# Patient Record
Sex: MALE | Race: WHITE | NOT HISPANIC OR LATINO | Employment: OTHER | URBAN - NONMETROPOLITAN AREA
[De-identification: names, ages, dates, MRNs, and addresses within clinical notes are randomized per-mention and may not be internally consistent; named-entity substitution may affect disease eponyms.]

---

## 2023-11-25 DIAGNOSIS — E78.2 MIXED HYPERLIPIDEMIA: ICD-10-CM

## 2023-11-27 PROBLEM — I25.10 CAD (CORONARY ARTERY DISEASE): Status: ACTIVE | Noted: 2023-11-27

## 2023-11-27 PROBLEM — Z95.2 S/P AORTIC VALVE REPLACEMENT: Status: ACTIVE | Noted: 2023-11-27

## 2023-11-27 PROBLEM — E78.5 HYPERLIPIDEMIA: Status: ACTIVE | Noted: 2023-11-27

## 2023-11-27 PROBLEM — H40.9 GLAUCOMA: Status: ACTIVE | Noted: 2023-11-27

## 2023-11-27 PROBLEM — Z95.1 S/P CABG X 2: Status: ACTIVE | Noted: 2023-11-27

## 2023-11-27 PROBLEM — F17.200 CURRENT SMOKER: Status: ACTIVE | Noted: 2023-11-27

## 2023-11-27 PROBLEM — J90 PLEURAL EFFUSION: Status: ACTIVE | Noted: 2023-11-27

## 2023-11-27 PROBLEM — I10 HTN (HYPERTENSION): Status: ACTIVE | Noted: 2023-11-27

## 2023-11-27 PROBLEM — R01.1 HEART MURMUR: Status: ACTIVE | Noted: 2023-11-27

## 2023-11-27 PROBLEM — E11.9 DIABETES MELLITUS (MULTI): Status: ACTIVE | Noted: 2023-11-27

## 2023-11-27 PROBLEM — I63.9 CVA (CEREBRAL VASCULAR ACCIDENT) (MULTI): Status: ACTIVE | Noted: 2023-11-27

## 2023-11-27 RX ORDER — LISINOPRIL 20 MG/1
20 TABLET ORAL DAILY
COMMUNITY
End: 2024-05-09 | Stop reason: ALTCHOICE

## 2023-11-27 RX ORDER — IBUPROFEN 100 MG/5ML
1 SUSPENSION, ORAL (FINAL DOSE FORM) ORAL 2 TIMES DAILY
COMMUNITY
Start: 2021-05-10

## 2023-11-27 RX ORDER — INSULIN GLARGINE 100 [IU]/ML
INJECTION, SOLUTION SUBCUTANEOUS
COMMUNITY

## 2023-11-27 RX ORDER — METOPROLOL TARTRATE 25 MG/1
25 TABLET, FILM COATED ORAL 2 TIMES DAILY
COMMUNITY
End: 2024-05-09 | Stop reason: DRUGHIGH

## 2023-11-27 RX ORDER — INSULIN GLARGINE 100 [IU]/ML
20 INJECTION, SOLUTION SUBCUTANEOUS DAILY
COMMUNITY
End: 2024-05-09 | Stop reason: ALTCHOICE

## 2023-11-27 RX ORDER — ATORVASTATIN CALCIUM 40 MG/1
40 TABLET, FILM COATED ORAL NIGHTLY
COMMUNITY
End: 2024-05-09 | Stop reason: SDUPTHER

## 2023-11-27 RX ORDER — NAPROXEN SODIUM 220 MG/1
1 TABLET ORAL 2 TIMES DAILY
COMMUNITY
Start: 2021-05-10

## 2023-11-27 RX ORDER — ACETAMINOPHEN 500 MG
1 TABLET ORAL DAILY
COMMUNITY
Start: 2021-05-10

## 2023-11-27 RX ORDER — ASPIRIN 81 MG/1
1 TABLET ORAL DAILY
COMMUNITY
Start: 2021-05-10

## 2023-11-27 RX ORDER — LANOLIN ALCOHOL/MO/W.PET/CERES
1 CREAM (GRAM) TOPICAL DAILY
COMMUNITY
Start: 2021-05-10

## 2023-11-28 RX ORDER — ATORVASTATIN CALCIUM 40 MG/1
40 TABLET, FILM COATED ORAL NIGHTLY
Qty: 90 TABLET | Refills: 3 | Status: SHIPPED | OUTPATIENT
Start: 2023-11-28 | End: 2024-05-09 | Stop reason: SDUPTHER

## 2024-05-09 ENCOUNTER — OFFICE VISIT (OUTPATIENT)
Dept: CARDIOLOGY | Facility: CLINIC | Age: 64
End: 2024-05-09
Payer: MEDICARE

## 2024-05-09 VITALS
SYSTOLIC BLOOD PRESSURE: 146 MMHG | WEIGHT: 225 LBS | BODY MASS INDEX: 33.33 KG/M2 | HEART RATE: 60 BPM | DIASTOLIC BLOOD PRESSURE: 94 MMHG | HEIGHT: 69 IN

## 2024-05-09 DIAGNOSIS — F17.200 CURRENT SMOKER: ICD-10-CM

## 2024-05-09 DIAGNOSIS — I25.10 CORONARY ARTERY DISEASE INVOLVING NATIVE CORONARY ARTERY OF NATIVE HEART WITHOUT ANGINA PECTORIS: Primary | ICD-10-CM

## 2024-05-09 DIAGNOSIS — I10 PRIMARY HYPERTENSION: ICD-10-CM

## 2024-05-09 DIAGNOSIS — Z95.1 S/P CABG X 2: ICD-10-CM

## 2024-05-09 DIAGNOSIS — Z95.2 S/P AORTIC VALVE REPLACEMENT: ICD-10-CM

## 2024-05-09 DIAGNOSIS — E78.2 MIXED HYPERLIPIDEMIA: ICD-10-CM

## 2024-05-09 PROCEDURE — 1159F MED LIST DOCD IN RCRD: CPT | Performed by: INTERNAL MEDICINE

## 2024-05-09 PROCEDURE — 3080F DIAST BP >= 90 MM HG: CPT | Performed by: INTERNAL MEDICINE

## 2024-05-09 PROCEDURE — 1036F TOBACCO NON-USER: CPT | Performed by: INTERNAL MEDICINE

## 2024-05-09 PROCEDURE — 99214 OFFICE O/P EST MOD 30 MIN: CPT | Performed by: INTERNAL MEDICINE

## 2024-05-09 PROCEDURE — 3077F SYST BP >= 140 MM HG: CPT | Performed by: INTERNAL MEDICINE

## 2024-05-09 PROCEDURE — 3008F BODY MASS INDEX DOCD: CPT | Performed by: INTERNAL MEDICINE

## 2024-05-09 RX ORDER — LISINOPRIL AND HYDROCHLOROTHIAZIDE 20; 25 MG/1; MG/1
1 TABLET ORAL DAILY
Qty: 90 TABLET | Refills: 3 | Status: SHIPPED | OUTPATIENT
Start: 2024-05-09 | End: 2025-05-09

## 2024-05-09 RX ORDER — AMLODIPINE BESYLATE 2.5 MG/1
2.5 TABLET ORAL DAILY
Qty: 30 TABLET | Refills: 11 | Status: SHIPPED | OUTPATIENT
Start: 2024-05-09 | End: 2024-05-10

## 2024-05-09 RX ORDER — LISINOPRIL AND HYDROCHLOROTHIAZIDE 20; 25 MG/1; MG/1
1 TABLET ORAL DAILY
COMMUNITY
Start: 2024-03-06 | End: 2024-05-09 | Stop reason: SDUPTHER

## 2024-05-09 RX ORDER — METOPROLOL TARTRATE 50 MG/1
50 TABLET ORAL
Qty: 180 TABLET | Refills: 3 | Status: SHIPPED | OUTPATIENT
Start: 2024-05-09 | End: 2025-05-09

## 2024-05-09 RX ORDER — SITAGLIPTIN AND METFORMIN HYDROCHLORIDE 1000; 50 MG/1; MG/1
TABLET, FILM COATED, EXTENDED RELEASE ORAL
COMMUNITY
Start: 2024-03-05

## 2024-05-09 RX ORDER — METOPROLOL TARTRATE 50 MG/1
50 TABLET ORAL
COMMUNITY
Start: 2024-03-26 | End: 2024-05-09 | Stop reason: SDUPTHER

## 2024-05-09 RX ORDER — LATANOPROST 50 UG/ML
1 SOLUTION/ DROPS OPHTHALMIC NIGHTLY
COMMUNITY
Start: 2024-03-01

## 2024-05-09 RX ORDER — ATORVASTATIN CALCIUM 40 MG/1
40 TABLET, FILM COATED ORAL NIGHTLY
Qty: 90 TABLET | Refills: 3 | Status: SHIPPED | OUTPATIENT
Start: 2024-05-09

## 2024-05-09 NOTE — LETTER
May 9, 2024     Shaneka Cancino DO  [address] [facility] [address]    Patient: Miguel Angel Chamberlain   YOB: 1960   Date of Visit: 5/9/2024       Dear Dr. Shaneka Cancino, DO:    Thank you for referring Miguel Angel Chamberlain to me for evaluation. Below are my notes for this consultation.  If you have questions, please do not hesitate to call me. I look forward to following your patient along with you.       Sincerely,     Mike Knott MD      CC: No Recipients  ______________________________________________________________________________________    Subjective   Miguel Angel Chamberlain is a 63 y.o. male       Chief Complaint    Annual Exam          HPI     Review of Systems   All other systems reviewed and are negative.     Declines echo    Patient returns in follow-up of problems as noted.  He states he is doing well.  He feels unchanged from before.  He underwent two-vessel bypass and bioprosthetic aortic valve replacement about 3 years ago.  He states he has had no change in his functional status.    I advised him that it has been 3 years since surgical intervention and that an echocardiogram should be done.  He states the surgeon told him that the valve did not need to be checked for 5 years and because of this he wishes to forego the echocardiogram despite my recommendation.    I cannot elicit from him any angina or heart failure symptoms.  It appears that his lipids are adequately treated.  Blood pressure could be improved upon and because of this I recommended adjustments to medical therapy.  Is not clear whether he is taking lisinopril with HCTZ or lisinopril alone.  Nonetheless we will make sure he takes that and also add amlodipine.  Blood pressure check in several months with nurse practitioner follow-up with cardiology probably next year.  He was educated regarding the merits of diet exercise weight loss and its impact on blood pressure and cardiovascular health.      Vitals:    05/09/24  "0837   BP: (!) 146/94   BP Location: Left arm   Patient Position: Sitting   Pulse: 60   Weight: 102 kg (225 lb)   Height: 1.746 m (5' 8.75\")        Objective   Physical Exam  Constitutional:       Appearance: Normal appearance.   HENT:      Nose: Nose normal.   Neck:      Vascular: No carotid bruit.   Cardiovascular:      Rate and Rhythm: Normal rate.      Pulses: Normal pulses.      Heart sounds: Normal heart sounds.   Pulmonary:      Effort: Pulmonary effort is normal.   Abdominal:      General: Bowel sounds are normal.      Palpations: Abdomen is soft.   Musculoskeletal:         General: Normal range of motion.      Cervical back: Normal range of motion.      Right lower leg: No edema.      Left lower leg: No edema.   Skin:     General: Skin is warm and dry.   Neurological:      General: No focal deficit present.      Mental Status: He is alert.   Psychiatric:         Mood and Affect: Mood normal.         Behavior: Behavior normal.         Thought Content: Thought content normal.         Judgment: Judgment normal.         Allergies  Patient has no known allergies.     Current Medications    Current Outpatient Medications:   •  ascorbic acid (Vitamin C) 1,000 mg tablet, 1 tablet (1,000 mg) 2 times a day., Disp: , Rfl:   •  aspirin 81 mg EC tablet, Take 1 tablet (81 mg) by mouth once daily., Disp: , Rfl:   •  atorvastatin (Lipitor) 40 mg tablet, TAKE 1 TABLET BY MOUTH AT BEDTIME., Disp: 90 tablet, Rfl: 3  •  Basaglar KwikPen U-100 Insulin 100 unit/mL (3 mL) pen, Inject under the skin., Disp: , Rfl:   •  cholecalciferol (Vitamin D-3) 5,000 Units tablet, Take 1 tablet (5,000 Units) by mouth once daily., Disp: , Rfl:   •  cyanocobalamin (Vitamin B-12) 1,000 mcg tablet, Take 1 tablet (1,000 mcg) by mouth once daily., Disp: , Rfl:   •  Janumet XR 50-1,000 mg tablet, ER multiphase 24 hr, TAKE 2 TABLETS BY MOUTH EVERY DAY WITH THE EVENING MEAL, Disp: , Rfl:   •  latanoprost (Xalatan) 0.005 % ophthalmic solution, " Administer 1 drop into both eyes once daily at bedtime., Disp: , Rfl:   •  lisinopril 20 mg tablet, Take 1 tablet (20 mg) by mouth once daily., Disp: , Rfl:   •  lisinopriL-hydrochlorothiazide 20-25 mg tablet, Take 1 tablet by mouth once daily., Disp: , Rfl:   •  metoprolol tartrate (Lopressor) 50 mg tablet, Take 1 tablet by mouth 2 times a day with meals., Disp: , Rfl:   •  omega 3-dha-epa-fish oil (Fish OiL) 1,200 (144-216) mg capsule, Take 1 capsule (1,200 mg) by mouth 2 times a day., Disp: , Rfl:                      Assessment/Plan   1. Coronary artery disease involving native coronary artery of native heart without angina pectoris  No anginal symptoms following bypass surgery    2. Primary hypertension  Could be improved upon in regards to management hence we will make certain he takes lisinopril HCT and we will add amlodipine.  Blood pressure check in several months.    3. Mixed hyperlipidemia  Review of treatment strategy demonstrates adequate management    4. S/P CABG x 2  Concurrent with valve replacement which was the primary indication for surgery    5. S/P aortic valve replacement  For severe aortic stenosis now asymptomatic    6. Current smoker  The merits of cessation were discussed    7. BMI 33.0-33.9,adult  The merits of diet and weight loss were advocated        Scribe Attestation  By signing my name below, IMichelle LPN, Scribe   attest that this documentation has been prepared under the direction and in the presence of Mike Knott MD.     Provider Attestation - Scribe documentation    All medical record entries made by the Scribe were at my direction and personally dictated by me. I have reviewed the chart and agree that the record accurately reflects my personal performance of the history, physical exam, discussion and plan.

## 2024-05-09 NOTE — PROGRESS NOTES
"Subjective   Miguel Angel Chamberlain is a 63 y.o. male       Chief Complaint    Annual Exam          HPI     Review of Systems   All other systems reviewed and are negative.     Declines echo    Patient returns in follow-up of problems as noted.  He states he is doing well.  He feels unchanged from before.  He underwent two-vessel bypass and bioprosthetic aortic valve replacement about 3 years ago.  He states he has had no change in his functional status.    I advised him that it has been 3 years since surgical intervention and that an echocardiogram should be done.  He states the surgeon told him that the valve did not need to be checked for 5 years and because of this he wishes to forego the echocardiogram despite my recommendation.    I cannot elicit from him any angina or heart failure symptoms.  It appears that his lipids are adequately treated.  Blood pressure could be improved upon and because of this I recommended adjustments to medical therapy.  Is not clear whether he is taking lisinopril with HCTZ or lisinopril alone.  Nonetheless we will make sure he takes that and also add amlodipine.  Blood pressure check in several months with nurse practitioner follow-up with cardiology probably next year.  He was educated regarding the merits of diet exercise weight loss and its impact on blood pressure and cardiovascular health.      Vitals:    05/09/24 0837   BP: (!) 146/94   BP Location: Left arm   Patient Position: Sitting   Pulse: 60   Weight: 102 kg (225 lb)   Height: 1.746 m (5' 8.75\")        Objective   Physical Exam  Constitutional:       Appearance: Normal appearance.   HENT:      Nose: Nose normal.   Neck:      Vascular: No carotid bruit.   Cardiovascular:      Rate and Rhythm: Normal rate.      Pulses: Normal pulses.      Heart sounds: Normal heart sounds.   Pulmonary:      Effort: Pulmonary effort is normal.   Abdominal:      General: Bowel sounds are normal.      Palpations: Abdomen is soft.   Musculoskeletal:   "       General: Normal range of motion.      Cervical back: Normal range of motion.      Right lower leg: No edema.      Left lower leg: No edema.   Skin:     General: Skin is warm and dry.   Neurological:      General: No focal deficit present.      Mental Status: He is alert.   Psychiatric:         Mood and Affect: Mood normal.         Behavior: Behavior normal.         Thought Content: Thought content normal.         Judgment: Judgment normal.         Allergies  Patient has no known allergies.     Current Medications    Current Outpatient Medications:     ascorbic acid (Vitamin C) 1,000 mg tablet, 1 tablet (1,000 mg) 2 times a day., Disp: , Rfl:     aspirin 81 mg EC tablet, Take 1 tablet (81 mg) by mouth once daily., Disp: , Rfl:     atorvastatin (Lipitor) 40 mg tablet, TAKE 1 TABLET BY MOUTH AT BEDTIME., Disp: 90 tablet, Rfl: 3    Basaglar KwikPen U-100 Insulin 100 unit/mL (3 mL) pen, Inject under the skin., Disp: , Rfl:     cholecalciferol (Vitamin D-3) 5,000 Units tablet, Take 1 tablet (5,000 Units) by mouth once daily., Disp: , Rfl:     cyanocobalamin (Vitamin B-12) 1,000 mcg tablet, Take 1 tablet (1,000 mcg) by mouth once daily., Disp: , Rfl:     Janumet XR 50-1,000 mg tablet, ER multiphase 24 hr, TAKE 2 TABLETS BY MOUTH EVERY DAY WITH THE EVENING MEAL, Disp: , Rfl:     latanoprost (Xalatan) 0.005 % ophthalmic solution, Administer 1 drop into both eyes once daily at bedtime., Disp: , Rfl:     lisinopril 20 mg tablet, Take 1 tablet (20 mg) by mouth once daily., Disp: , Rfl:     lisinopriL-hydrochlorothiazide 20-25 mg tablet, Take 1 tablet by mouth once daily., Disp: , Rfl:     metoprolol tartrate (Lopressor) 50 mg tablet, Take 1 tablet by mouth 2 times a day with meals., Disp: , Rfl:     omega 3-dha-epa-fish oil (Fish OiL) 1,200 (144-216) mg capsule, Take 1 capsule (1,200 mg) by mouth 2 times a day., Disp: , Rfl:                      Assessment/Plan   1. Coronary artery disease involving native coronary artery  of native heart without angina pectoris  No anginal symptoms following bypass surgery    2. Primary hypertension  Could be improved upon in regards to management hence we will make certain he takes lisinopril HCT and we will add amlodipine.  Blood pressure check in several months.    3. Mixed hyperlipidemia  Review of treatment strategy demonstrates adequate management    4. S/P CABG x 2  Concurrent with valve replacement which was the primary indication for surgery    5. S/P aortic valve replacement  For severe aortic stenosis now asymptomatic    6. Current smoker  The merits of cessation were discussed    7. BMI 33.0-33.9,adult  The merits of diet and weight loss were advocated        Scribe Attestation  By signing my name below, I, Paloma Martinez LPN   attest that this documentation has been prepared under the direction and in the presence of Mike Knott MD.     Provider Attestation - Scribe documentation    All medical record entries made by the Scribe were at my direction and personally dictated by me. I have reviewed the chart and agree that the record accurately reflects my personal performance of the history, physical exam, discussion and plan.

## 2024-05-10 RX ORDER — AMLODIPINE BESYLATE 2.5 MG/1
2.5 TABLET ORAL DAILY
Qty: 90 TABLET | Refills: 3 | Status: SHIPPED | OUTPATIENT
Start: 2024-05-10

## 2024-05-24 ENCOUNTER — TELEPHONE (OUTPATIENT)
Dept: CARDIOLOGY | Facility: CLINIC | Age: 64
End: 2024-05-24
Payer: COMMERCIAL

## 2024-05-24 DIAGNOSIS — Z95.2 S/P AORTIC VALVE REPLACEMENT: ICD-10-CM

## 2024-05-24 NOTE — TELEPHONE ENCOUNTER
Patient is having a dental cleaning and elton and plague cleaning the office is inquiring on if he needs to have antibiotics and if he needs to hold asa     Please advise

## 2024-05-29 RX ORDER — AMOXICILLIN 500 MG/1
1000 CAPSULE ORAL EVERY 12 HOURS SCHEDULED
Start: 2024-05-29 | End: 2024-05-30

## 2024-08-26 ENCOUNTER — APPOINTMENT (OUTPATIENT)
Dept: CARDIOLOGY | Facility: CLINIC | Age: 64
End: 2024-08-26
Payer: COMMERCIAL

## 2024-10-21 ENCOUNTER — APPOINTMENT (OUTPATIENT)
Dept: CARDIOLOGY | Facility: CLINIC | Age: 64
End: 2024-10-21
Payer: COMMERCIAL

## 2025-02-25 DIAGNOSIS — I10 PRIMARY HYPERTENSION: ICD-10-CM

## 2025-02-25 DIAGNOSIS — E78.2 MIXED HYPERLIPIDEMIA: ICD-10-CM

## 2025-02-25 RX ORDER — AMLODIPINE BESYLATE 2.5 MG/1
2.5 TABLET ORAL DAILY
Qty: 90 TABLET | Refills: 3 | Status: SHIPPED | OUTPATIENT
Start: 2025-02-25 | End: 2026-02-25

## 2025-02-25 RX ORDER — ATORVASTATIN CALCIUM 40 MG/1
40 TABLET, FILM COATED ORAL DAILY
Qty: 90 TABLET | Refills: 3 | Status: SHIPPED | OUTPATIENT
Start: 2025-02-25 | End: 2026-02-25

## 2025-02-25 RX ORDER — LISINOPRIL AND HYDROCHLOROTHIAZIDE 20; 25 MG/1; MG/1
1 TABLET ORAL DAILY
Qty: 90 TABLET | Refills: 3 | Status: SHIPPED | OUTPATIENT
Start: 2025-02-25 | End: 2026-02-25

## 2025-05-13 ENCOUNTER — APPOINTMENT (OUTPATIENT)
Dept: CARDIOLOGY | Facility: CLINIC | Age: 65
End: 2025-05-13
Payer: COMMERCIAL

## 2025-06-02 ENCOUNTER — APPOINTMENT (OUTPATIENT)
Dept: CARDIOLOGY | Facility: CLINIC | Age: 65
End: 2025-06-02
Payer: MEDICARE

## 2025-06-02 VITALS
HEART RATE: 68 BPM | HEIGHT: 68 IN | SYSTOLIC BLOOD PRESSURE: 128 MMHG | BODY MASS INDEX: 34.92 KG/M2 | WEIGHT: 230.4 LBS | DIASTOLIC BLOOD PRESSURE: 70 MMHG

## 2025-06-02 DIAGNOSIS — I25.10 CORONARY ARTERY DISEASE INVOLVING NATIVE CORONARY ARTERY OF NATIVE HEART WITHOUT ANGINA PECTORIS: ICD-10-CM

## 2025-06-02 DIAGNOSIS — Z95.2 S/P AORTIC VALVE REPLACEMENT: ICD-10-CM

## 2025-06-02 DIAGNOSIS — F17.200 CURRENT SMOKER: ICD-10-CM

## 2025-06-02 DIAGNOSIS — Z79.4 TYPE 2 DIABETES MELLITUS WITHOUT COMPLICATION, WITH LONG-TERM CURRENT USE OF INSULIN: ICD-10-CM

## 2025-06-02 DIAGNOSIS — I10 PRIMARY HYPERTENSION: ICD-10-CM

## 2025-06-02 DIAGNOSIS — E11.9 TYPE 2 DIABETES MELLITUS WITHOUT COMPLICATION, WITH LONG-TERM CURRENT USE OF INSULIN: ICD-10-CM

## 2025-06-02 DIAGNOSIS — E78.2 MIXED HYPERLIPIDEMIA: Primary | ICD-10-CM

## 2025-06-02 PROCEDURE — 3074F SYST BP LT 130 MM HG: CPT | Performed by: NURSE PRACTITIONER

## 2025-06-02 PROCEDURE — 1159F MED LIST DOCD IN RCRD: CPT | Performed by: NURSE PRACTITIONER

## 2025-06-02 PROCEDURE — 99213 OFFICE O/P EST LOW 20 MIN: CPT | Performed by: NURSE PRACTITIONER

## 2025-06-02 PROCEDURE — 3008F BODY MASS INDEX DOCD: CPT | Performed by: NURSE PRACTITIONER

## 2025-06-02 PROCEDURE — 4004F PT TOBACCO SCREEN RCVD TLK: CPT | Performed by: NURSE PRACTITIONER

## 2025-06-02 PROCEDURE — 3078F DIAST BP <80 MM HG: CPT | Performed by: NURSE PRACTITIONER

## 2025-06-02 PROCEDURE — 1160F RVW MEDS BY RX/DR IN RCRD: CPT | Performed by: NURSE PRACTITIONER

## 2025-06-02 RX ORDER — FLUTICASONE PROPIONATE 50 MCG
1 SPRAY, SUSPENSION (ML) NASAL DAILY
COMMUNITY

## 2025-06-02 RX ORDER — GUAIFENESIN 600 MG/1
1200 TABLET, EXTENDED RELEASE ORAL 2 TIMES DAILY
COMMUNITY

## 2025-06-02 ASSESSMENT — ENCOUNTER SYMPTOMS
DYSPNEA ON EXERTION: 0
PALPITATIONS: 0
PND: 0
NEAR-SYNCOPE: 0
ORTHOPNEA: 0
SYNCOPE: 0
IRREGULAR HEARTBEAT: 0

## 2025-06-02 NOTE — PATIENT INSTRUCTIONS
Please bring all medicines, vitamins, and herbal supplements with you when you come to the office.    Prescriptions will not be filled unless you are compliant with your follow up appointments or have a follow up appointment scheduled as per instruction of your physician. Refills should be requested at the time of your visit.     PLAN:   Through informed decision making process incorporating patients unique circumstances, the following treatment plan will be initiated:    1.  Prescription drug management of cardiovascular medication for efficacy, adherence to treatment, side effect assessment and polypharmacy. Current treatment clinically warranted and to continue without modifications.    2.  Echo (AVR) in one year    3. Fall screening reveals an occurrence over the course of the last year. Events were reviewed in detail with the patient and due to  NP one year    You need to stop smoking.  Though it is not easy, more than half of all adults smokers have quit.  We encourage you to write down all the reasons you should quit smoking and set a quit date for yourself.  Ask us how we can help.  You may also call 3-800-QUIT-NOW for free resources and assistance.    Discussed the dynamic nature of coronary artery disease and the importance of seeking medical attention if new symptoms arise.

## 2025-06-02 NOTE — LETTER
"June 2, 2025     Shaneka Cancino DO  [address] [facility] [address]    Patient: Miguel Angel Chamberlain   YOB: 1960   Date of Visit: 6/2/2025       Dear Dr. Shaneka Cancino DO:    Thank you for referring Miguel Angel Chamberlain to me for evaluation. Below are my notes for this consultation.  If you have questions, please do not hesitate to call me. I look forward to following your patient along with you.       Sincerely,     Suma uMstafa, APRN-CNP      CC: No Recipients  ______________________________________________________________________________________    Chief Complaint  \" I am doing okay\"    Reason for Visit  Annual follow-up  Patient presents to the office today for outpatient follow-up for coronary artery disease, AVR and secondary prevention.  Last evaluated in clinic by Dr. Marte May 2024.    Presents today ambulatory with steady gait.  Accompanied by patient  Patient denies any hospitalizations or significant changes to interval medical history since last office follow-up.   Follows routinely with PCP, reports recent annual wellness labs.    History of Present Illness   Patient is a 64-year-old gentleman who presents to the office today with no voiced cardiovascular complaints.  He remains aerobically active doing yard work, he has 5 acres that he tends to.  He spent the winter in [place] where he would golf on a regular basis, sometimes he would walk the course.  He denies any change in exercise capacity or functional tolerance.  Overall reports feeling\" the same\" as last office follow-up with Dr. Marte.    He reports he was asymptomatic prior to his AVR and bypass that simply they have been doing surveillance echocardiograms.    Patient reports that overall has no complaint(s) of chest pain, chest pressure/discomfort, claudication, dyspnea, exertional chest pressure/discomfort, fatigue, irregular heart beat, lower extremity edema, and near-syncope    Daily activity:   > 4 " "METs  Denies any change in exercise capacity or functional tolerance since last office visit.    The importance of secondary prevention reviewed:  HTN: Optimal  HLD: Treated  DM: Treated  Smoker: Unfortunately, continues to smoke and not ready to quit  BMI:  Reviewed the merits of healthy lifestyle choices on overall cardiovascular health.    EXTR will be the 5-year anniversary of AVR and will check surveillance echocardiogram.  Otherwise, no indication for ischemic evaluation at this time.    Review of Systems   Cardiovascular:  Negative for chest pain, dyspnea on exertion, irregular heartbeat, leg swelling, near-syncope, orthopnea, palpitations, paroxysmal nocturnal dyspnea and syncope.        Visit Vitals  /70 (BP Location: Left arm, Patient Position: Sitting)   Pulse 68   Ht 1.727 m (5' 8\")   Wt 105 kg (230 lb 6.4 oz)   BMI 35.03 kg/m²   Smoking Status Every Day   BSA 2.24 m²     Physical Exam  Vitals and nursing note reviewed.   Constitutional:       Appearance: Normal appearance.   Cardiovascular:      Rate and Rhythm: Normal rate and regular rhythm.      Heart sounds: Normal heart sounds.   Pulmonary:      Effort: Pulmonary effort is normal.      Breath sounds: Examination of the right-upper field reveals wheezing. Wheezing present.   Musculoskeletal:      Cervical back: Full passive range of motion without pain.      Right lower leg: No edema.      Left lower leg: No edema.   Skin:     General: Skin is cool.   Neurological:      Mental Status: He is alert and oriented to person, place, and time.   Psychiatric:         Attention and Perception: Attention normal.         Mood and Affect: Mood normal.         Behavior: Behavior is cooperative.          ALLERGIES:  Patient has no known allergies.    Current Outpatient Medications   Medication Instructions   • amLODIPine (NORVASC) 2.5 mg, oral, Daily   • ascorbic acid (Vitamin C) 1,000 mg tablet 1 tablet, 2 times daily   • aspirin 81 mg EC tablet 1 tablet, " Daily   • atorvastatin (LIPITOR) 40 mg, oral, Daily   • Basaglar KwikPen U-100 Insulin 100 unit/mL (3 mL) pen Inject under the skin.   • cholecalciferol (Vitamin D-3) 5,000 Units tablet 1 tablet, Daily   • cyanocobalamin (Vitamin B-12) 1,000 mcg tablet 1 tablet, Daily   • fluticasone (Flonase) 50 mcg/actuation nasal spray 1 spray, Daily   • guaiFENesin (MUCINEX) 1,200 mg, 2 times daily   • Janumet XR 50-1,000 mg tablet, ER multiphase 24 hr Take 1 tablet by mouth 2 times a day.   • latanoprost (Xalatan) 0.005 % ophthalmic solution 1 drop, Nightly   • lisinopriL-hydrochlorothiazide 20-25 mg tablet 1 tablet, oral, Daily   • metoprolol tartrate (LOPRESSOR) 50 mg, oral, 2 times daily (morning and late afternoon)   • omega 3-dha-epa-fish oil (Fish OiL) 1,200 (144-216) mg capsule 1 capsule, 2 times daily      Assessment:    Current smoker  Under 1 pack per day  Wife is a smoker    Continued every day tobacco use. Have reviewed the negative cardiovascular impact of nicotine. Continues to decline pharmacological assistance.      S/P aortic valve replacement  October 2021  AVR #27 mm prosthetic valve    Hyperlipidemia  Moderate intensity statin  Reports he has had recent annual wellness labs with PCP    HTN (hypertension)  Optimal in office    CAD (coronary artery disease)  October 2021 CABG x 2-details not available    Diabetes mellitus (Multi)  On statin/ACE inhibitor  Reports most recent hemoglobin A1c less than 7    BMI 35.0-35.9,adult  Reviewed the merits of healthy lifestyle choices on overall cardiovascular health.      Plan:     Through informed decision making process incorporating patients unique circumstances, the following treatment plan will be initiated:    1.  Prescription drug management of cardiovascular medication for efficacy, adherence to treatment, side effect assessment and polypharmacy. Current treatment clinically warranted and to continue without modifications.    2.  Echo (AVR) in one year    3. Fall  screening reveals an occurrence over the course of the last year. Events were reviewed in detail with the patient and due to  NP one year    You need to stop smoking.  Though it is not easy, more than half of all adults smokers have quit.  We encourage you to write down all the reasons you should quit smoking and set a quit date for yourself.  Ask us how we can help.  You may also call 0-ADMETAQUIT-NOW for free resources and assistance.    Discussed the dynamic nature of coronary artery disease and the importance of seeking medical attention if new symptoms arise.      Suma Mustafa MSN, APRN-CNP, PMHNP-[facility] [place]  [place], [place]     Please excuse any errors in grammar or translation related to this dictation. Voice recognition software was utilized to prepare this document.

## 2025-06-02 NOTE — PROGRESS NOTES
"Chief Complaint  \" I am doing okay\"    Reason for Visit  Annual follow-up  Patient presents to the office today for outpatient follow-up for coronary artery disease, AVR and secondary prevention.  Last evaluated in clinic by Dr. Marte May 2024.    Presents today ambulatory with steady gait.  Accompanied by patient  Patient denies any hospitalizations or significant changes to interval medical history since last office follow-up.   Follows routinely with PCP, reports recent annual wellness labs.    History of Present Illness   Patient is a 64-year-old gentleman who presents to the office today with no voiced cardiovascular complaints.  He remains aerobically active doing yard work, he has 5 acres that he tends to.  He spent the winter in [place] where he would golf on a regular basis, sometimes he would walk the course.  He denies any change in exercise capacity or functional tolerance.  Overall reports feeling\" the same\" as last office follow-up with Dr. Marte.    He reports he was asymptomatic prior to his AVR and bypass that simply they have been doing surveillance echocardiograms.    Patient reports that overall has no complaint(s) of chest pain, chest pressure/discomfort, claudication, dyspnea, exertional chest pressure/discomfort, fatigue, irregular heart beat, lower extremity edema, and near-syncope    Daily activity:   > 4 METs  Denies any change in exercise capacity or functional tolerance since last office visit.    The importance of secondary prevention reviewed:  HTN: Optimal  HLD: Treated  DM: Treated  Smoker: Unfortunately, continues to smoke and not ready to quit  BMI:  Reviewed the merits of healthy lifestyle choices on overall cardiovascular health.    EXTR will be the 5-year anniversary of AVR and will check surveillance echocardiogram.  Otherwise, no indication for ischemic evaluation at this time.    Review of Systems   Cardiovascular:  Negative for chest pain, dyspnea on exertion, irregular " "heartbeat, leg swelling, near-syncope, orthopnea, palpitations, paroxysmal nocturnal dyspnea and syncope.        Visit Vitals  /70 (BP Location: Left arm, Patient Position: Sitting)   Pulse 68   Ht 1.727 m (5' 8\")   Wt 105 kg (230 lb 6.4 oz)   BMI 35.03 kg/m²   Smoking Status Every Day   BSA 2.24 m²     Physical Exam  Vitals and nursing note reviewed.   Constitutional:       Appearance: Normal appearance.   Cardiovascular:      Rate and Rhythm: Normal rate and regular rhythm.      Heart sounds: Normal heart sounds.   Pulmonary:      Effort: Pulmonary effort is normal.      Breath sounds: Examination of the right-upper field reveals wheezing. Wheezing present.   Musculoskeletal:      Cervical back: Full passive range of motion without pain.      Right lower leg: No edema.      Left lower leg: No edema.   Skin:     General: Skin is cool.   Neurological:      Mental Status: He is alert and oriented to person, place, and time.   Psychiatric:         Attention and Perception: Attention normal.         Mood and Affect: Mood normal.         Behavior: Behavior is cooperative.          ALLERGIES:  Patient has no known allergies.    Current Outpatient Medications   Medication Instructions    amLODIPine (NORVASC) 2.5 mg, oral, Daily    ascorbic acid (Vitamin C) 1,000 mg tablet 1 tablet, 2 times daily    aspirin 81 mg EC tablet 1 tablet, Daily    atorvastatin (LIPITOR) 40 mg, oral, Daily    Basaglar KwikPen U-100 Insulin 100 unit/mL (3 mL) pen Inject under the skin.    cholecalciferol (Vitamin D-3) 5,000 Units tablet 1 tablet, Daily    cyanocobalamin (Vitamin B-12) 1,000 mcg tablet 1 tablet, Daily    fluticasone (Flonase) 50 mcg/actuation nasal spray 1 spray, Daily    guaiFENesin (MUCINEX) 1,200 mg, 2 times daily    Janumet XR 50-1,000 mg tablet, ER multiphase 24 hr Take 1 tablet by mouth 2 times a day.    latanoprost (Xalatan) 0.005 % ophthalmic solution 1 drop, Nightly    lisinopriL-hydrochlorothiazide 20-25 mg tablet 1 " tablet, oral, Daily    metoprolol tartrate (LOPRESSOR) 50 mg, oral, 2 times daily (morning and late afternoon)    omega 3-dha-epa-fish oil (Fish OiL) 1,200 (144-216) mg capsule 1 capsule, 2 times daily      Assessment:    Current smoker  Under 1 pack per day  Wife is a smoker    Continued every day tobacco use. Have reviewed the negative cardiovascular impact of nicotine. Continues to decline pharmacological assistance.      S/P aortic valve replacement  October 2021  AVR #27 mm prosthetic valve    Hyperlipidemia  Moderate intensity statin  Reports he has had recent annual wellness labs with PCP    HTN (hypertension)  Optimal in office    CAD (coronary artery disease)  October 2021 CABG x 2-details not available    Diabetes mellitus (Multi)  On statin/ACE inhibitor  Reports most recent hemoglobin A1c less than 7    BMI 35.0-35.9,adult  Reviewed the merits of healthy lifestyle choices on overall cardiovascular health.      Plan:     Through informed decision making process incorporating patients unique circumstances, the following treatment plan will be initiated:    1.  Prescription drug management of cardiovascular medication for efficacy, adherence to treatment, side effect assessment and polypharmacy. Current treatment clinically warranted and to continue without modifications.    2.  Echo (AVR) in one year    3. Fall screening reveals an occurrence over the course of the last year. Events were reviewed in detail with the patient and due to  NP one year    You need to stop smoking.  Though it is not easy, more than half of all adults smokers have quit.  We encourage you to write down all the reasons you should quit smoking and set a quit date for yourself.  Ask us how we can help.  You may also call 2-650-QUIT-NOW for free resources and assistance.    Discussed the dynamic nature of coronary artery disease and the importance of seeking medical attention if new symptoms arise.      Suma Mustafa MSN, APRN-CNP,  PMHNP-[facility] [place]  [place], [place]     Please excuse any errors in grammar or translation related to this dictation. Voice recognition software was utilized to prepare this document.

## 2025-06-02 NOTE — ASSESSMENT & PLAN NOTE
Under 1 pack per day  Wife is a smoker    Continued every day tobacco use. Have reviewed the negative cardiovascular impact of nicotine. Continues to decline pharmacological assistance.

## 2026-06-02 ENCOUNTER — APPOINTMENT (OUTPATIENT)
Dept: CARDIOLOGY | Facility: CLINIC | Age: 66
End: 2026-06-02
Payer: COMMERCIAL